# Patient Record
Sex: MALE | Race: WHITE | NOT HISPANIC OR LATINO | URBAN - METROPOLITAN AREA
[De-identification: names, ages, dates, MRNs, and addresses within clinical notes are randomized per-mention and may not be internally consistent; named-entity substitution may affect disease eponyms.]

---

## 2022-01-01 ENCOUNTER — AMBULATORY SURGICAL CENTER (OUTPATIENT)
Dept: URBAN - METROPOLITAN AREA SURGERY 4 | Facility: SURGERY | Age: 72
End: 2022-01-01
Payer: COMMERCIAL

## 2022-01-01 ENCOUNTER — OFFICE (OUTPATIENT)
Dept: URBAN - METROPOLITAN AREA PATHOLOGY 1 | Facility: PATHOLOGY | Age: 72
End: 2022-01-01
Payer: COMMERCIAL

## 2022-01-01 ENCOUNTER — OFFICE (OUTPATIENT)
Dept: URBAN - METROPOLITAN AREA CLINIC 13 | Facility: CLINIC | Age: 72
End: 2022-01-01
Payer: COMMERCIAL

## 2022-01-01 VITALS
RESPIRATION RATE: 20 BRPM | DIASTOLIC BLOOD PRESSURE: 61 MMHG | DIASTOLIC BLOOD PRESSURE: 88 MMHG | HEART RATE: 66 BPM | OXYGEN SATURATION: 86 % | SYSTOLIC BLOOD PRESSURE: 100 MMHG | SYSTOLIC BLOOD PRESSURE: 133 MMHG | OXYGEN SATURATION: 95 % | SYSTOLIC BLOOD PRESSURE: 108 MMHG | RESPIRATION RATE: 4 BRPM | DIASTOLIC BLOOD PRESSURE: 61 MMHG | SYSTOLIC BLOOD PRESSURE: 156 MMHG | HEART RATE: 82 BPM | OXYGEN SATURATION: 94 % | SYSTOLIC BLOOD PRESSURE: 105 MMHG | RESPIRATION RATE: 11 BRPM | HEART RATE: 62 BPM | DIASTOLIC BLOOD PRESSURE: 65 MMHG | OXYGEN SATURATION: 95 % | OXYGEN SATURATION: 96 % | SYSTOLIC BLOOD PRESSURE: 98 MMHG | TEMPERATURE: 97.7 F | SYSTOLIC BLOOD PRESSURE: 133 MMHG | SYSTOLIC BLOOD PRESSURE: 143 MMHG | DIASTOLIC BLOOD PRESSURE: 70 MMHG | SYSTOLIC BLOOD PRESSURE: 160 MMHG | RESPIRATION RATE: 28 BRPM | DIASTOLIC BLOOD PRESSURE: 62 MMHG | DIASTOLIC BLOOD PRESSURE: 94 MMHG | RESPIRATION RATE: 16 BRPM | OXYGEN SATURATION: 86 % | SYSTOLIC BLOOD PRESSURE: 108 MMHG | RESPIRATION RATE: 11 BRPM | OXYGEN SATURATION: 98 % | HEART RATE: 61 BPM | DIASTOLIC BLOOD PRESSURE: 81 MMHG | SYSTOLIC BLOOD PRESSURE: 101 MMHG | OXYGEN SATURATION: 98 % | SYSTOLIC BLOOD PRESSURE: 105 MMHG | HEART RATE: 66 BPM | SYSTOLIC BLOOD PRESSURE: 162 MMHG | RESPIRATION RATE: 20 BRPM | RESPIRATION RATE: 17 BRPM | RESPIRATION RATE: 29 BRPM | HEART RATE: 61 BPM | SYSTOLIC BLOOD PRESSURE: 128 MMHG | HEART RATE: 77 BPM | RESPIRATION RATE: 4 BRPM | OXYGEN SATURATION: 94 % | HEIGHT: 71 IN | DIASTOLIC BLOOD PRESSURE: 70 MMHG | SYSTOLIC BLOOD PRESSURE: 128 MMHG | RESPIRATION RATE: 17 BRPM | HEART RATE: 77 BPM | DIASTOLIC BLOOD PRESSURE: 81 MMHG | RESPIRATION RATE: 16 BRPM | SYSTOLIC BLOOD PRESSURE: 98 MMHG | HEART RATE: 62 BPM | DIASTOLIC BLOOD PRESSURE: 88 MMHG | SYSTOLIC BLOOD PRESSURE: 160 MMHG | DIASTOLIC BLOOD PRESSURE: 62 MMHG | SYSTOLIC BLOOD PRESSURE: 101 MMHG | RESPIRATION RATE: 28 BRPM | HEART RATE: 63 BPM | HEIGHT: 71 IN | DIASTOLIC BLOOD PRESSURE: 64 MMHG | SYSTOLIC BLOOD PRESSURE: 100 MMHG | DIASTOLIC BLOOD PRESSURE: 94 MMHG | SYSTOLIC BLOOD PRESSURE: 93 MMHG | OXYGEN SATURATION: 96 % | HEART RATE: 82 BPM | HEART RATE: 60 BPM | RESPIRATION RATE: 19 BRPM | HEART RATE: 63 BPM | OXYGEN SATURATION: 99 % | TEMPERATURE: 97.7 F | SYSTOLIC BLOOD PRESSURE: 93 MMHG | RESPIRATION RATE: 15 BRPM | RESPIRATION RATE: 29 BRPM | DIASTOLIC BLOOD PRESSURE: 64 MMHG | DIASTOLIC BLOOD PRESSURE: 87 MMHG | DIASTOLIC BLOOD PRESSURE: 87 MMHG | SYSTOLIC BLOOD PRESSURE: 143 MMHG | HEART RATE: 60 BPM | RESPIRATION RATE: 15 BRPM | OXYGEN SATURATION: 99 % | SYSTOLIC BLOOD PRESSURE: 162 MMHG | SYSTOLIC BLOOD PRESSURE: 156 MMHG | DIASTOLIC BLOOD PRESSURE: 65 MMHG | RESPIRATION RATE: 19 BRPM

## 2022-01-01 VITALS
SYSTOLIC BLOOD PRESSURE: 110 MMHG | HEART RATE: 63 BPM | WEIGHT: 171 LBS | DIASTOLIC BLOOD PRESSURE: 72 MMHG | OXYGEN SATURATION: 98 % | HEIGHT: 71 IN

## 2022-01-01 VITALS
SYSTOLIC BLOOD PRESSURE: 130 MMHG | OXYGEN SATURATION: 97 % | DIASTOLIC BLOOD PRESSURE: 82 MMHG | WEIGHT: 184 LBS | HEIGHT: 71 IN | HEART RATE: 73 BPM

## 2022-01-01 VITALS
HEIGHT: 71 IN | OXYGEN SATURATION: 97 % | WEIGHT: 190 LBS | HEART RATE: 65 BPM | SYSTOLIC BLOOD PRESSURE: 120 MMHG | DIASTOLIC BLOOD PRESSURE: 84 MMHG

## 2022-01-01 DIAGNOSIS — D50.9 IRON DEFICIENCY ANEMIA, UNSPECIFIED: ICD-10-CM

## 2022-01-01 DIAGNOSIS — Z86.010 PERSONAL HISTORY OF COLONIC POLYPS: ICD-10-CM

## 2022-01-01 DIAGNOSIS — D12.0 BENIGN NEOPLASM OF CECUM: ICD-10-CM

## 2022-01-01 DIAGNOSIS — Z98.84 BARIATRIC SURGERY STATUS: ICD-10-CM

## 2022-01-01 DIAGNOSIS — K64.4 RESIDUAL HEMORRHOIDAL SKIN TAGS: ICD-10-CM

## 2022-01-01 DIAGNOSIS — K57.30 DIVERTICULOSIS OF LARGE INTESTINE WITHOUT PERFORATION OR ABS: ICD-10-CM

## 2022-01-01 DIAGNOSIS — Z48.815 ENCOUNTER FOR SURGICAL AFTERCARE FOLLOWING SURGERY ON THE DI: ICD-10-CM

## 2022-01-01 DIAGNOSIS — Z79.01 LONG TERM (CURRENT) USE OF ANTICOAGULANTS: ICD-10-CM

## 2022-01-01 DIAGNOSIS — K62.1 RECTAL POLYP: ICD-10-CM

## 2022-01-01 DIAGNOSIS — D68.59 OTHER PRIMARY THROMBOPHILIA: ICD-10-CM

## 2022-01-01 DIAGNOSIS — R19.4 CHANGE IN BOWEL HABIT: ICD-10-CM

## 2022-01-01 DIAGNOSIS — Z83.71 FAMILY HISTORY OF COLONIC POLYPS: ICD-10-CM

## 2022-01-01 DIAGNOSIS — R91.1 SOLITARY PULMONARY NODULE: ICD-10-CM

## 2022-01-01 DIAGNOSIS — D49.0 NEOPLASM OF UNSPECIFIED BEHAVIOR OF DIGESTIVE SYSTEM: ICD-10-CM

## 2022-01-01 LAB
C DIFF SCREEN: C DIFFICILE SCREEN: NEGATIVE
CBC, PLATELET CT  AND  DIFF: ABS BASOPHIL: 0 K/UL
CBC, PLATELET CT  AND  DIFF: ABS EOSINOPHIL: 0.2 K/UL
CBC, PLATELET CT  AND  DIFF: ABS IMMATURE GRANS: 0 K/UL
CBC, PLATELET CT  AND  DIFF: ABS LYMPHOCYTE: 2.1 K/UL
CBC, PLATELET CT  AND  DIFF: ABS MONOCYTE: 0.9 K/UL
CBC, PLATELET CT  AND  DIFF: ABS NEUTROPHIL: 1.9 K/UL
CBC, PLATELET CT  AND  DIFF: BASOPHIL: 0.6 %
CBC, PLATELET CT  AND  DIFF: DIFFERENTIAL: (no result)
CBC, PLATELET CT  AND  DIFF: EOSINOPHIL: 3.5 %
CBC, PLATELET CT  AND  DIFF: HEMATOCRIT: 41.1 %
CBC, PLATELET CT  AND  DIFF: HEMOGLOBIN: 13.2 G/DL
CBC, PLATELET CT  AND  DIFF: IMMATURE GRANULOCYTES: 0.2 %
CBC, PLATELET CT  AND  DIFF: LYMPHOCYTE: 42 %
CBC, PLATELET CT  AND  DIFF: MCH: 29.1 PG
CBC, PLATELET CT  AND  DIFF: MCHC: 32.1 G/DL
CBC, PLATELET CT  AND  DIFF: MCV: 90.5 FL
CBC, PLATELET CT  AND  DIFF: MONOCYTE: 16.9 % — HIGH
CBC, PLATELET CT  AND  DIFF: MPV: 11 FL
CBC, PLATELET CT  AND  DIFF: NEUTROPHIL: 36.8 % — LOW
CBC, PLATELET CT  AND  DIFF: NRBCS: 0 /100 WBC
CBC, PLATELET CT  AND  DIFF: PLATELET COUNT: 245 K/UL
CBC, PLATELET CT  AND  DIFF: RBC: 4.54 M/UL
CBC, PLATELET CT  AND  DIFF: RDW: 17.3 % — HIGH
CBC, PLATELET CT  AND  DIFF: WBC COUNT: 5.1 K/UL
COMPLETE BLOOD COUNT: HEMATOCRIT: 40.4 %
COMPLETE BLOOD COUNT: HEMOGLOBIN: 13.1 G/DL
COMPLETE BLOOD COUNT: MCH: 30 PG
COMPLETE BLOOD COUNT: MCHC: 32.4 G/DL
COMPLETE BLOOD COUNT: MCV: 92.4 FL
COMPLETE BLOOD COUNT: MPV: 11.1 FL
COMPLETE BLOOD COUNT: PLATELET COUNT: 305 K/UL
COMPLETE BLOOD COUNT: RBC: 4.37 M/UL
COMPLETE BLOOD COUNT: RDW: 14.5 %
COMPLETE BLOOD COUNT: WBC COUNT: 6.4 K/UL
FERRITIN: 64 NG/ML
LACTOFERRIN, QL, STL: NEGATIVE
O AND P  ROUTINE: CRYPTOSPORIDIUM AG EIA: NEGATIVE
O AND P  ROUTINE: GIARDIA DUODENALIS AG: NEGATIVE
PANCREATIC ELASTASE-1: 29 MCG/G — LOW
PDF: PDF REPORT: (no result)
PDF: PDF REPORT: (no result)

## 2022-01-01 PROCEDURE — 43235 EGD DIAGNOSTIC BRUSH WASH: CPT | Performed by: INTERNAL MEDICINE

## 2022-01-01 PROCEDURE — 99204 OFFICE O/P NEW MOD 45 MIN: CPT | Performed by: INTERNAL MEDICINE

## 2022-01-01 PROCEDURE — 99214 OFFICE O/P EST MOD 30 MIN: CPT | Performed by: INTERNAL MEDICINE

## 2022-01-01 PROCEDURE — 45385 COLONOSCOPY W/LESION REMOVAL: CPT | Performed by: INTERNAL MEDICINE

## 2022-01-01 PROCEDURE — 88305 TISSUE EXAM BY PATHOLOGIST: CPT | Performed by: PATHOLOGY

## 2022-08-12 PROBLEM — D64.9 ANEMIA: Status: ACTIVE | Noted: 2022-01-01

## 2022-08-12 PROBLEM — Z48.815 ENCNTR FOR SURGICAL AFTCR FOLLOWING SURGERY ON THE DGSTV SYS: Status: ACTIVE | Noted: 2022-01-01

## 2022-08-12 PROBLEM — K62.1 RECTAL POLYP: Status: ACTIVE | Noted: 2022-01-01

## 2022-11-03 NOTE — SERVICEHPINOTES
Bubba Castro   is seen today for a follow-up visit.    G gastro/NexGen/referral data is reviewed prior to office visit/consultation. Past medical history, medications, surgical history family history and social history are reviewed on this visit. Patient is following up on iron deficiency anemia. Recent CBC is normal. 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 Recent EGD colonoscopy was unremarkable, he underwent CT enterography due to his ICD this was also unremarkable. He actually is not on any Creon due to cost issues his bowels are moving every other day. We discussed his negative i.e. normal fecal fat 24-hour collection.. His elastase was so low that 1 would really expect he would have chronic diarrhea daily. We discussed having his family doctor check a hemoglobin about every 3 or 4 months or so to make sure his hemoglobin is not going back down. Iron absorption may be problematic due to his Oscar-en-Y from an oral standpoint but we discussed taking 1 iron pill daily with some type of vitamin C in the interim. We discussed watching his stools for signs and symptoms of GI bleeding as well as signs of hematuria due to chronic Eliquis therapy.